# Patient Record
Sex: FEMALE | Race: WHITE | NOT HISPANIC OR LATINO | Employment: FULL TIME | URBAN - METROPOLITAN AREA
[De-identification: names, ages, dates, MRNs, and addresses within clinical notes are randomized per-mention and may not be internally consistent; named-entity substitution may affect disease eponyms.]

---

## 2017-05-25 ENCOUNTER — OFFICE VISIT (OUTPATIENT)
Dept: INTERNAL MEDICINE | Facility: CLINIC | Age: 32
End: 2017-05-25
Payer: COMMERCIAL

## 2017-05-25 VITALS
HEIGHT: 65 IN | DIASTOLIC BLOOD PRESSURE: 74 MMHG | HEART RATE: 79 BPM | OXYGEN SATURATION: 99 % | WEIGHT: 140.44 LBS | SYSTOLIC BLOOD PRESSURE: 110 MMHG | BODY MASS INDEX: 23.4 KG/M2

## 2017-05-25 DIAGNOSIS — Z00.00 VISIT FOR ANNUAL HEALTH EXAMINATION: Primary | ICD-10-CM

## 2017-05-25 DIAGNOSIS — R82.90 ABNORMAL URINALYSIS: ICD-10-CM

## 2017-05-25 LAB
BACTERIA #/AREA URNS AUTO: NORMAL /HPF
BILIRUB UR QL STRIP: NEGATIVE
CLARITY UR REFRACT.AUTO: CLEAR
COLOR UR AUTO: YELLOW
GLUCOSE UR QL STRIP: NEGATIVE
HGB UR QL STRIP: NEGATIVE
KETONES UR QL STRIP: NEGATIVE
LEUKOCYTE ESTERASE UR QL STRIP: ABNORMAL
MICROSCOPIC COMMENT: NORMAL
NITRITE UR QL STRIP: NEGATIVE
PH UR STRIP: 5 [PH] (ref 5–8)
PROT UR QL STRIP: NEGATIVE
RBC #/AREA URNS AUTO: 1 /HPF (ref 0–4)
SP GR UR STRIP: 1.02 (ref 1–1.03)
SQUAMOUS #/AREA URNS AUTO: 5 /HPF
URN SPEC COLLECT METH UR: ABNORMAL
UROBILINOGEN UR STRIP-ACNC: NEGATIVE EU/DL
WBC #/AREA URNS AUTO: 2 /HPF (ref 0–5)

## 2017-05-25 PROCEDURE — 99999 PR PBB SHADOW E&M-NEW PATIENT-LVL III: CPT | Mod: PBBFAC,,, | Performed by: INTERNAL MEDICINE

## 2017-05-25 PROCEDURE — 99395 PREV VISIT EST AGE 18-39: CPT | Mod: S$GLB,,, | Performed by: INTERNAL MEDICINE

## 2017-05-25 PROCEDURE — 81001 URINALYSIS AUTO W/SCOPE: CPT

## 2017-05-25 NOTE — PROGRESS NOTES
INTERNAL MEDICINE INITIAL VISIT NOTE      CHIEF COMPLAINT     Chief Complaint   Patient presents with    Annual Exam       HPI     Suyapa Merritt is a 31 y.o.  female who presents with a PMHx of     Past Medical History:  Past Medical History:   Diagnosis Date    Seizures     at 15 yo, w/u neg, ?sz in , no issues since     Here for annual visit.  Prev seen by [facility] at [facility] last [facility] at which time labs were done which were all apparently normal x microscopic hematuria on u/a but says she thinks she was at the end of her menstrual cycle and here for repeat.    Is getting  in Nov and also has questions regarding pre- vitamins.  Admits to anxiety when it comes to gyn related issues but has prev discussed this c Dr. Herman, her gyn, and currently s issues and utd on her pap.    Had excision of a mole by Dr. Marin yesterday 2/2 dysplasia on bx, final path pending.    Past Surgical History:  Past Surgical History:   Procedure Laterality Date    mole excision      dysplasia, no melanoma       Home Medications:  Prior to Admission medications    Not on File       Allergies:  Review of patient's allergies indicates:  No Known Allergies    Family History:  Family History   Problem Relation Age of Onset    Osteoarthritis Mother     No Known Problems Father     Skin cancer Maternal Grandfather     Osteoporosis Paternal Grandmother     No Known Problems Paternal Grandfather        Social History:  Social History   Substance Use Topics    Smoking status: Never Smoker    Smokeless tobacco: Not on file    Alcohol use Yes      Comment: socially       Review of Systems:  Review of Systems   Constitutional: Negative for appetite change, chills, fatigue, fever and unexpected weight change.   HENT: Negative for congestion, hearing loss and rhinorrhea.    Eyes: Negative for pain and visual disturbance.   Respiratory: Negative for cough, chest tightness, shortness of breath and wheezing.   "  Cardiovascular: Negative for chest pain, palpitations and leg swelling.   Gastrointestinal: Negative for abdominal distention and abdominal pain.   Endocrine: Negative for polydipsia and polyuria.   Genitourinary: Negative for decreased urine volume, difficulty urinating, dysuria, hematuria and vaginal discharge.   Neurological: Negative for weakness, numbness and headaches.   Psychiatric/Behavioral: Negative for behavioral problems and confusion.       Health Maintenance:   Immunizations:   Influenza is not up to date, rec this Fall  TDap is up to date 2-3 years ago done at urgent care p stepping on a nail    Cancer Screening:  PAP: is up to date. Fall 2016, told neg c neg HPV and f/u 5 yrs, Dr. Herman  Mammogram: rec at 40    PHYSICAL EXAM     /74 (BP Location: Right arm, Patient Position: Sitting)   Pulse 79   Ht 5' 5" (1.651 m)   Wt 63.7 kg (140 lb 6.9 oz)   SpO2 99%   BMI 23.37 kg/m²     GEN - A+OX4, NAD   HEENT - PERRL, EOMI, OP clear x small growth of skin near R salivary gland under tongue, pt to f/u c dentist this week.  Neck - No thyromegaly or cervical LAD. No thyroid masses felt.  CV - RRR, no m/r/g  Chest - CTAB, no wheezing, crackles, or rhonchi  Abd - S/NT/ND/+BS.   Ext - 2+BDP and radial pulses. No C/C/E.  Neuro - 5/5 BUE and BLE strength.  LN - No LAD appreciated.  Skin - Normal color and texture, no rash, no skin lesions.      LABS     From [facility] Fall [facility] requested today.    ASSESSMENT/PLAN     Suyapa Merritt is a 31 y.o. female with  Suyapa was seen today for annual exam.    Diagnoses and all orders for this visit:    Visit for annual health examination        -     History and physical exam completed.  Health maintenance reviewed as above.        - Can take prenatal MVI now in prep for potential conception later this year or next.    Abnormal urinalysis  -     Hx ashley hematuria likely 2/2 menses.        - URINALYSIS to be done today.    Hx seizures        - No issues, will monitor " for now, avoid meds that can lower sz threshold.    HM as above    RTC in 12 months, sooner if needed and depending on labs.    Radha Negron MD  Department of Internal Medicine - [facility]  05/25/2017  8:51 AM

## 2017-12-08 ENCOUNTER — OFFICE VISIT (OUTPATIENT)
Dept: INTERNAL MEDICINE | Facility: CLINIC | Age: 32
End: 2017-12-08
Payer: COMMERCIAL

## 2017-12-08 VITALS
OXYGEN SATURATION: 99 % | BODY MASS INDEX: 22.34 KG/M2 | SYSTOLIC BLOOD PRESSURE: 120 MMHG | DIASTOLIC BLOOD PRESSURE: 72 MMHG | WEIGHT: 134.25 LBS | HEART RATE: 76 BPM

## 2017-12-08 DIAGNOSIS — O03.9 MISCARRIAGE: ICD-10-CM

## 2017-12-08 DIAGNOSIS — R14.2 BELCHING: Primary | ICD-10-CM

## 2017-12-08 PROCEDURE — 99999 PR PBB SHADOW E&M-EST. PATIENT-LVL III: CPT | Mod: PBBFAC,,, | Performed by: INTERNAL MEDICINE

## 2017-12-08 PROCEDURE — 99213 OFFICE O/P EST LOW 20 MIN: CPT | Mod: S$GLB,,, | Performed by: INTERNAL MEDICINE

## 2017-12-08 RX ORDER — NAPROXEN SODIUM 550 MG/1
550 TABLET ORAL
COMMUNITY
End: 2018-05-23

## 2017-12-08 RX ORDER — MISOPROSTOL 200 UG/1
100 TABLET ORAL NIGHTLY
COMMUNITY
End: 2018-05-23

## 2017-12-08 RX ORDER — OXYCODONE HYDROCHLORIDE AND ACETAMINOPHEN 325; 5 MG/5ML; MG/5ML
SOLUTION ORAL
COMMUNITY
End: 2018-05-23

## 2017-12-08 NOTE — PROGRESS NOTES
Subjective:       Patient ID: Suyapa Merritt is a 32 y.o. female.    Chief Complaint: Gastroesophageal Reflux and Bloated     HPI: Patient complains of mild abdominal cramps and belching 3 days ago she was discovered to have a miscarriage.  It was incomplete and she had to use misoprostol vaginally and was given Percocet and naproxen.  She took those for little over 24 hr not in the last 24 hr she had a bowel movement yesterday and today, ate some oatmeal this but was having some upper abdominal cramps and belching.  She thought she was having indigestion and took some Pepto-Bismol and some Tums.  This helped a little but the belching has continued.  She is constantly belching office she says she is tired and just like take a nap that she has not been sleeping well.  She is still having some lower pelvic cramps that is better than was day or 2 ago.  She has a call in to her gynecologist but has not no fever, blood in the stool or urine.  Vomiting.  No cough no rash      Gastroesophageal Reflux   She complains of abdominal pain ( mild drainage with belching). She reports no chest pain, no coughing, no sore throat or no wheezing.     Review of Systems   Constitutional: Negative for appetite change, chills and fever.   HENT: Negative for nosebleeds, sinus pain and sore throat.    Eyes: Negative for visual disturbance.   Respiratory: Negative for cough, shortness of breath and wheezing.    Cardiovascular: Negative for chest pain and leg swelling.   Gastrointestinal: Positive for abdominal pain ( mild drainage with belching). Negative for constipation and diarrhea.   Genitourinary: Negative for difficulty urinating and hematuria.   Musculoskeletal: Negative for neck pain and neck stiffness.   Skin: Negative for pallor and rash.   Neurological: Negative for headaches.   Psychiatric/Behavioral: Negative for dysphoric mood and suicidal ideas. The patient is not nervous/anxious.        Objective:      Physical Exam    Constitutional: She is oriented to person, place, and time. She appears well-developed and well-nourished. No distress.   HENT:   Head: Normocephalic and atraumatic.   Right Ear: External ear normal.   Left Ear: External ear normal.   Mouth/Throat: Oropharynx is clear and moist. No oropharyngeal exudate.   Eyes: Conjunctivae are normal. Pupils are equal, round, and reactive to light. No scleral icterus.   Neck: Normal range of motion. Neck supple. No thyromegaly present.   Cardiovascular: Normal rate and regular rhythm.    No murmur heard.  Pulmonary/Chest: Effort normal and breath sounds normal. She has no wheezes.   Abdominal: Soft. Bowel sounds are normal. She exhibits no distension. There is no tenderness.   Normal active BS  No guarding rebound.  No rash or hernias   Musculoskeletal: She exhibits no tenderness.   Lymphadenopathy:     She has no cervical adenopathy.   Neurological: She is alert and oriented to person, place, and time.   Skin: No rash noted.   Psychiatric: She has a normal mood and affect.       Assessment:       1. Belching    2. Miscarriage        Plan:       Suyapa was seen today for gastroesophageal reflux and bloated.    Diagnoses and all orders for this visit:    Belching    Miscarriage         Felt to be from food, medicine, and anxiety.  Suggest bland diet, consider liquid antacid but not Pepto-Bismol avoid Percocet and naproxen and possibly.  Touch base with gynecologist call if symptoms fail to improve or worsen

## 2018-05-23 ENCOUNTER — OFFICE VISIT (OUTPATIENT)
Dept: INTERNAL MEDICINE | Facility: CLINIC | Age: 33
End: 2018-05-23
Payer: COMMERCIAL

## 2018-05-23 VITALS
DIASTOLIC BLOOD PRESSURE: 72 MMHG | HEART RATE: 81 BPM | HEIGHT: 65 IN | BODY MASS INDEX: 23.32 KG/M2 | SYSTOLIC BLOOD PRESSURE: 104 MMHG | OXYGEN SATURATION: 99 % | WEIGHT: 140 LBS

## 2018-05-23 DIAGNOSIS — Z00.00 VISIT FOR ANNUAL HEALTH EXAMINATION: Primary | ICD-10-CM

## 2018-05-23 DIAGNOSIS — Z13.1 SCREENING FOR DIABETES MELLITUS: ICD-10-CM

## 2018-05-23 DIAGNOSIS — F41.9 ANXIETY: ICD-10-CM

## 2018-05-23 DIAGNOSIS — Z13.0 SCREENING, ANEMIA, DEFICIENCY, IRON: ICD-10-CM

## 2018-05-23 DIAGNOSIS — Z13.29 SCREENING FOR THYROID DISORDER: ICD-10-CM

## 2018-05-23 DIAGNOSIS — Z13.220 LIPID SCREENING: ICD-10-CM

## 2018-05-23 PROCEDURE — 99999 PR PBB SHADOW E&M-EST. PATIENT-LVL III: CPT | Mod: PBBFAC,,, | Performed by: INTERNAL MEDICINE

## 2018-05-23 PROCEDURE — 99395 PREV VISIT EST AGE 18-39: CPT | Mod: S$GLB,,, | Performed by: INTERNAL MEDICINE

## 2018-05-23 RX ORDER — ALPRAZOLAM 0.5 MG/1
TABLET ORAL
Qty: 1 TABLET | Refills: 0 | Status: SHIPPED | OUTPATIENT
Start: 2018-05-23 | End: 2020-01-22

## 2018-05-23 NOTE — PROGRESS NOTES
INTERNAL MEDICINE ESTABLISHED PATIENT VISIT NOTE    Subjective:     Chief Complaint: Annual Exam       Patient ID: Suyapa Merritt is a 32 y.o. female with hx seizures in childhood but none since, last seen by me 5/2017, here today for annual exam.    Since last appt, got pregnant but miscarried in Dec.  Says heartbeat was never detected.    Now considering trying to get pregnant again, has appt pending to [facility] robert Lawson.    Past Medical History:  Past Medical History:   Diagnosis Date    Seizures     at 15 yo, w/u neg, ?sz in 2005, no issues since       Home Medications:  Prior to Admission medications    Medication Sig Start Date End Date Taking? Authorizing Provider   miSOPROStol (CYTOTEC) 200 MCG Tab Take 100 mcg by mouth nightly.    Historical Provider, MD   naproxen sodium (ANAPROX) 550 MG tablet Take 550 mg by mouth as needed.    Historical Provider, MD   oxyCODONE-acetaminophen (ROXICET) 5-325 mg/5 mL Soln Take by mouth.    Historical Provider, MD       Allergies:  Review of patient's allergies indicates:  No Known Allergies    Social History:  Social History   Substance Use Topics    Smoking status: Never Smoker    Smokeless tobacco: Not on file    Alcohol use Yes      Comment: socially        Review of Systems   Constitutional: Negative for activity change and unexpected weight change.   HENT: Negative for hearing loss, rhinorrhea and trouble swallowing.    Eyes: Negative for discharge and visual disturbance.   Respiratory: Negative for chest tightness and wheezing.    Cardiovascular: Negative for chest pain and palpitations.   Gastrointestinal: Negative for blood in stool, constipation, diarrhea and vomiting.   Endocrine: Negative for polydipsia and polyuria.   Genitourinary: Negative for difficulty urinating, dysuria, hematuria and menstrual problem.   Musculoskeletal: Negative for arthralgias, joint swelling and neck pain.   Neurological: Negative for weakness and headaches.  "  Psychiatric/Behavioral: Negative for confusion and dysphoric mood.         Health Maintenance:     Immunizations:   Influenza is not up to date, rec this Fall  TDap is up to date around 2015 done at urgent care p stepping on a nail     Cancer Screening:  PAP: is up to date. Fall 2016, told neg c neg HPV and f/u 5 yrs, Dr. Herman  Mammogram: rec at 40    Objective:   /72 (BP Location: Left arm, Patient Position: Sitting, BP Method: Large (Manual))   Pulse 81   Ht 5' 5" (1.651 m)   Wt 63.5 kg (139 lb 15.9 oz)   LMP 04/30/2018 (Within Days)   SpO2 99%   BMI 23.30 kg/m²        General: AAO x3, no apparent distress  CV: RRR, no m/r/g  Pulm: Lungs CTAB, no crackles, no wheezes  Abd: s/NT/ND +BS  Extremities: no c/c/e    Labs:         Assessment/Plan     Suyapa was seen today for annual exam.    Diagnoses and all orders for this visit:    Visit for annual health examination  History and physical exam completed.  Health maintenance reviewed as above.    Screening, anemia, deficiency, iron  -     CBC auto differential; Future    Screening for diabetes mellitus  -     Comprehensive metabolic panel; Future  -     Hemoglobin A1c; Future    Lipid screening  -     Lipid panel; Future    Screening for thyroid disorder  -     TSH; Future    Anxiety  Related to lab draws, fainted on last appt  Will give xanax 30 min prior and her  will take her.  -     ALPRAZolam (XANAX) 0.5 MG tablet; Take one tablet 30 min prior to lab draw.        HM as above  RTC in one year for f/u, sooner if needed and depending on labs.    Radha Negron MD  Department of Internal Medicine - [facility]  05/23/2018  "

## 2018-06-07 ENCOUNTER — LAB VISIT (OUTPATIENT)
Dept: LAB | Facility: HOSPITAL | Age: 33
End: 2018-06-07
Attending: INTERNAL MEDICINE
Payer: COMMERCIAL

## 2018-06-07 DIAGNOSIS — Z13.1 SCREENING FOR DIABETES MELLITUS: ICD-10-CM

## 2018-06-07 DIAGNOSIS — Z13.0 SCREENING, ANEMIA, DEFICIENCY, IRON: ICD-10-CM

## 2018-06-07 DIAGNOSIS — Z13.220 LIPID SCREENING: ICD-10-CM

## 2018-06-07 DIAGNOSIS — Z13.29 SCREENING FOR THYROID DISORDER: ICD-10-CM

## 2018-06-07 LAB
ALBUMIN SERPL BCP-MCNC: 4 G/DL
ALP SERPL-CCNC: 42 U/L
ALT SERPL W/O P-5'-P-CCNC: 19 U/L
ANION GAP SERPL CALC-SCNC: 6 MMOL/L
AST SERPL-CCNC: 20 U/L
BASOPHILS # BLD AUTO: 0.02 K/UL
BASOPHILS NFR BLD: 0.4 %
BILIRUB SERPL-MCNC: 0.6 MG/DL
BUN SERPL-MCNC: 10 MG/DL
CALCIUM SERPL-MCNC: 9.2 MG/DL
CHLORIDE SERPL-SCNC: 108 MMOL/L
CHOLEST SERPL-MCNC: 159 MG/DL
CHOLEST/HDLC SERPL: 2.9 {RATIO}
CO2 SERPL-SCNC: 26 MMOL/L
CREAT SERPL-MCNC: 0.7 MG/DL
DIFFERENTIAL METHOD: NORMAL
EOSINOPHIL # BLD AUTO: 0.1 K/UL
EOSINOPHIL NFR BLD: 2.5 %
ERYTHROCYTE [DISTWIDTH] IN BLOOD BY AUTOMATED COUNT: 12.5 %
EST. GFR  (AFRICAN AMERICAN): >60 ML/MIN/1.73 M^2
EST. GFR  (NON AFRICAN AMERICAN): >60 ML/MIN/1.73 M^2
ESTIMATED AVG GLUCOSE: 85 MG/DL
GLUCOSE SERPL-MCNC: 89 MG/DL
HBA1C MFR BLD HPLC: 4.6 %
HCT VFR BLD AUTO: 38.5 %
HDLC SERPL-MCNC: 55 MG/DL
HDLC SERPL: 34.6 %
HGB BLD-MCNC: 12.7 G/DL
LDLC SERPL CALC-MCNC: 84.8 MG/DL
LYMPHOCYTES # BLD AUTO: 1.8 K/UL
LYMPHOCYTES NFR BLD: 35.1 %
MCH RBC QN AUTO: 29.7 PG
MCHC RBC AUTO-ENTMCNC: 33 G/DL
MCV RBC AUTO: 90 FL
MONOCYTES # BLD AUTO: 0.5 K/UL
MONOCYTES NFR BLD: 9.2 %
NEUTROPHILS # BLD AUTO: 2.7 K/UL
NEUTROPHILS NFR BLD: 52.6 %
NONHDLC SERPL-MCNC: 104 MG/DL
PLATELET # BLD AUTO: 168 K/UL
PMV BLD AUTO: 10 FL
POTASSIUM SERPL-SCNC: 4.4 MMOL/L
PROT SERPL-MCNC: 6.8 G/DL
RBC # BLD AUTO: 4.28 M/UL
SODIUM SERPL-SCNC: 140 MMOL/L
TRIGL SERPL-MCNC: 96 MG/DL
TSH SERPL DL<=0.005 MIU/L-ACNC: 1.35 UIU/ML
WBC # BLD AUTO: 5.1 K/UL

## 2018-06-07 PROCEDURE — 80061 LIPID PANEL: CPT

## 2018-06-07 PROCEDURE — 83036 HEMOGLOBIN GLYCOSYLATED A1C: CPT

## 2018-06-07 PROCEDURE — 80053 COMPREHEN METABOLIC PANEL: CPT

## 2018-06-07 PROCEDURE — 36415 COLL VENOUS BLD VENIPUNCTURE: CPT

## 2018-06-07 PROCEDURE — 84443 ASSAY THYROID STIM HORMONE: CPT

## 2018-06-07 PROCEDURE — 85025 COMPLETE CBC W/AUTO DIFF WBC: CPT

## 2019-01-22 ENCOUNTER — TELEPHONE (OUTPATIENT)
Dept: INTERNAL MEDICINE | Facility: CLINIC | Age: 34
End: 2019-01-22

## 2019-01-22 DIAGNOSIS — K21.9 GASTROESOPHAGEAL REFLUX DISEASE, ESOPHAGITIS PRESENCE NOT SPECIFIED: Primary | ICD-10-CM

## 2019-01-22 RX ORDER — OMEPRAZOLE 40 MG/1
40 CAPSULE, DELAYED RELEASE ORAL DAILY
Qty: 90 CAPSULE | Refills: 0 | Status: SHIPPED | OUTPATIENT
Start: 2019-01-22 | End: 2021-02-03

## 2019-01-22 NOTE — TELEPHONE ENCOUNTER
----- Message from Taylor Edwards sent at 1/22/2019  1:10 PM CST -----  Contact: [facility] request  Message     ----- Message from [facility], System Message sent at 1/21/2019 11:29 AM CST -----    Appointment Request From: Suyapa Merritt    With Provider: Radha Negron MD [[facility] - Internal Medicine]    Preferred Date Range: 1/21/2019 - 1/31/2019    Preferred Times: Any time    Reason for visit: Existing Patient    Comments:  Hi, I've had bad stomach acid for a few months. Painful burping all day/night, before/during/after meals. Tried tums/Pepcid with no luck.

## 2019-01-22 NOTE — TELEPHONE ENCOUNTER
Spoke to pt who states worsening heartburn symptoms with reflux and belching, associated c meals.  Has tried tums and zantac s relief.  No wt loss, dysphagia, blood in stools, or black/tarry stools.  rec trial of PPI such as prilosec.  If not better in 1-2 mos, pt to contact me for f/u appt at which time we can consider EGD/H.pylori testing.  rx sent.

## 2019-01-29 ENCOUNTER — PATIENT MESSAGE (OUTPATIENT)
Dept: INTERNAL MEDICINE | Facility: CLINIC | Age: 34
End: 2019-01-29

## 2019-01-29 DIAGNOSIS — Z82.79 FAMILY HISTORY OF BICUSPID AORTIC VALVE: Primary | ICD-10-CM

## 2019-01-30 ENCOUNTER — PATIENT MESSAGE (OUTPATIENT)
Dept: INTERNAL MEDICINE | Facility: CLINIC | Age: 34
End: 2019-01-30

## 2019-01-30 NOTE — TELEPHONE ENCOUNTER
Pt reports mom c bicuspid aorta and aneurysm, requesting screening.  Will check echo, MA to assist c scheduling.

## 2019-02-06 ENCOUNTER — HOSPITAL ENCOUNTER (OUTPATIENT)
Dept: CARDIOLOGY | Facility: CLINIC | Age: 34
Discharge: HOME | End: 2019-02-06
Attending: INTERNAL MEDICINE
Payer: COMMERCIAL

## 2019-02-06 VITALS
DIASTOLIC BLOOD PRESSURE: 62 MMHG | WEIGHT: 139 LBS | SYSTOLIC BLOOD PRESSURE: 126 MMHG | HEIGHT: 65 IN | HEART RATE: 78 BPM | BODY MASS INDEX: 23.16 KG/M2

## 2019-02-06 DIAGNOSIS — Z82.79 FAMILY HISTORY OF BICUSPID AORTIC VALVE: ICD-10-CM

## 2019-02-06 LAB
ASCENDING AORTA: 3.48 CM
BSA FOR ECHO PROCEDURE: 1.7 M2
CV ECHO LV RWT: 0.25 CM
DOP CALC LVOT AREA: 3.05 CM2
DOP CALC LVOT DIAMETER: 1.97 CM
DOP CALC LVOT PEAK VEL: 1.11 M/S
DOP CALC LVOT STROKE VOLUME: 60.84 CM3
DOP CALCLVOT PEAK VEL VTI: 19.97 CM
E WAVE DECELERATION TIME: 145.55 MSEC
E/A RATIO: 1.44
E/E' RATIO: 4.6
ECHO LV POSTERIOR WALL: 0.61 CM (ref 0.6–1.1)
FRACTIONAL SHORTENING: 29 % (ref 28–44)
INTERVENTRICULAR SEPTUM: 0.59 CM (ref 0.6–1.1)
IVRT: 0.07 MSEC
LA MAJOR: 4.76 CM
LA MINOR: 4.4 CM
LA WIDTH: 3.42 CM
LEFT ATRIUM SIZE: 3.26 CM
LEFT ATRIUM VOLUME INDEX: 25.6 ML/M2
LEFT ATRIUM VOLUME: 43.34 CM3
LEFT INTERNAL DIMENSION IN SYSTOLE: 3.42 CM (ref 2.1–4)
LEFT VENTRICLE DIASTOLIC VOLUME INDEX: 64.72 ML/M2
LEFT VENTRICLE DIASTOLIC VOLUME: 109.68 ML
LEFT VENTRICLE MASS INDEX: 52.9 G/M2
LEFT VENTRICLE SYSTOLIC VOLUME INDEX: 28.3 ML/M2
LEFT VENTRICLE SYSTOLIC VOLUME: 47.94 ML
LEFT VENTRICULAR INTERNAL DIMENSION IN DIASTOLE: 4.84 CM (ref 3.5–6)
LEFT VENTRICULAR MASS: 89.6 G
LV LATERAL E/E' RATIO: 3.83
LV SEPTAL E/E' RATIO: 5.75
MV PEAK A VEL: 0.48 M/S
MV PEAK E VEL: 0.69 M/S
RA MAJOR: 4.13 CM
RA PRESSURE: 3 MMHG
RA WIDTH: 4.1 CM
RIGHT VENTRICULAR END-DIASTOLIC DIMENSION: 3.46 CM
RV TISSUE DOPPLER FREE WALL SYSTOLIC VELOCITY 1 (APICAL 4 CHAMBER VIEW): 12.12 M/S
SINUS: 2.91 CM
STJ: 2.69 CM
TDI LATERAL: 0.18
TDI SEPTAL: 0.12
TDI: 0.15
TRICUSPID ANNULAR PLANE SYSTOLIC EXCURSION: 1.97 CM

## 2019-02-06 PROCEDURE — 93306 TTE W/DOPPLER COMPLETE: CPT | Mod: S$GLB,,, | Performed by: INTERNAL MEDICINE

## 2019-02-06 PROCEDURE — 93306 TRANSTHORACIC ECHO (TTE) COMPLETE (CUPID ONLY): ICD-10-PCS | Mod: S$GLB,,, | Performed by: INTERNAL MEDICINE

## 2019-03-15 LAB — HPV MRNA E6/E7: NOT DETECTED

## 2020-01-02 ENCOUNTER — TELEPHONE (OUTPATIENT)
Dept: INTERNAL MEDICINE | Facility: CLINIC | Age: 35
End: 2020-01-02

## 2020-01-02 DIAGNOSIS — Z00.00 ANNUAL PHYSICAL EXAM: Primary | ICD-10-CM

## 2020-01-02 NOTE — TELEPHONE ENCOUNTER
----- Message from Marguerite Do sent at 1/2/2020  1:47 PM CST -----  Contact: self/ [phone]  Doctor appointment and lab have been scheduled.  Please link lab orders to the lab appointment.  Date of doctor appointment:  5/6  Date of lab appointment:    Physical or EP: phys  Comments: Patient is to have labs done at [facility] the week of April 25

## 2020-01-02 NOTE — TELEPHONE ENCOUNTER
----- Message from Dottie Romero sent at 1/2/2020 10:21 AM CST -----  Contact: Pt   Pt calling in to schedule annual.   Pt can be reached at [phone] thanks

## 2020-01-07 ENCOUNTER — PATIENT MESSAGE (OUTPATIENT)
Dept: INTERNAL MEDICINE | Facility: CLINIC | Age: 35
End: 2020-01-07

## 2020-01-08 ENCOUNTER — PATIENT OUTREACH (OUTPATIENT)
Dept: ADMINISTRATIVE | Facility: HOSPITAL | Age: 35
End: 2020-01-08

## 2020-01-08 NOTE — PROGRESS NOTES
Chart review completed. Pap & HPV results located in Care Everywhere- results entered & linked to health maintenance.

## 2020-01-21 LAB
ALBUMIN SERPL-MCNC: 4.1 G/DL (ref 3.6–5.1)
ALBUMIN/GLOB SERPL: 1.6 (CALC) (ref 1–2.5)
ALP SERPL-CCNC: 83 U/L (ref 33–115)
ALT SERPL-CCNC: 15 U/L (ref 6–29)
AST SERPL-CCNC: 20 U/L (ref 10–30)
BASOPHILS # BLD AUTO: 32 CELLS/UL (ref 0–200)
BASOPHILS NFR BLD AUTO: 0.8 %
BILIRUB SERPL-MCNC: 0.5 MG/DL (ref 0.2–1.2)
BUN SERPL-MCNC: 11 MG/DL (ref 7–25)
BUN/CREAT SERPL: NORMAL (CALC) (ref 6–22)
CALCIUM SERPL-MCNC: 9.1 MG/DL (ref 8.6–10.2)
CHLORIDE SERPL-SCNC: 108 MMOL/L (ref 98–110)
CHOLEST SERPL-MCNC: 155 MG/DL
CHOLEST/HDLC SERPL: 2.2 (CALC)
CO2 SERPL-SCNC: 27 MMOL/L (ref 20–32)
CREAT SERPL-MCNC: 0.65 MG/DL (ref 0.5–1.1)
EOSINOPHIL # BLD AUTO: 92 CELLS/UL (ref 15–500)
EOSINOPHIL NFR BLD AUTO: 2.3 %
ERYTHROCYTE [DISTWIDTH] IN BLOOD BY AUTOMATED COUNT: 12.2 % (ref 11–15)
GFRSERPLBLD MDRD-ARVRAT: 116 ML/MIN/1.73M2
GLOBULIN SER CALC-MCNC: 2.5 G/DL (CALC) (ref 1.9–3.7)
GLUCOSE SERPL-MCNC: 78 MG/DL (ref 65–99)
HCT VFR BLD AUTO: 37.2 % (ref 35–45)
HDLC SERPL-MCNC: 72 MG/DL
HGB BLD-MCNC: 11.9 G/DL (ref 11.7–15.5)
LDLC SERPL CALC-MCNC: 71 MG/DL (CALC)
LYMPHOCYTES # BLD AUTO: 1800 CELLS/UL (ref 850–3900)
LYMPHOCYTES NFR BLD AUTO: 45 %
MCH RBC QN AUTO: 26.9 PG (ref 27–33)
MCHC RBC AUTO-ENTMCNC: 32 G/DL (ref 32–36)
MCV RBC AUTO: 84.2 FL (ref 80–100)
MONOCYTES # BLD AUTO: 372 CELLS/UL (ref 200–950)
MONOCYTES NFR BLD AUTO: 9.3 %
NEUTROPHILS # BLD AUTO: 1704 CELLS/UL (ref 1500–7800)
NEUTROPHILS NFR BLD AUTO: 42.6 %
NONHDLC SERPL-MCNC: 83 MG/DL (CALC)
PLATELET # BLD AUTO: 189 THOUSAND/UL (ref 140–400)
PMV BLD REES-ECKER: 9.7 FL (ref 7.5–12.5)
POTASSIUM SERPL-SCNC: 4.2 MMOL/L (ref 3.5–5.3)
PROT SERPL-MCNC: 6.6 G/DL (ref 6.1–8.1)
RBC # BLD AUTO: 4.42 MILLION/UL (ref 3.8–5.1)
SODIUM SERPL-SCNC: 142 MMOL/L (ref 135–146)
TRIGL SERPL-MCNC: 42 MG/DL
TSH SERPL-ACNC: 0.91 MIU/L
WBC # BLD AUTO: 4 THOUSAND/UL (ref 3.8–10.8)

## 2020-01-21 NOTE — PROGRESS NOTES
INTERNAL MEDICINE ESTABLISHED PATIENT VISIT NOTE    Subjective:     Chief Complaint: Annual Exam       Patient ID: Suyapa Merritt is a 34 y.o. female with hx seizures in childhood but none since, hx uterine fibroids, last seen by me 2018, here today for routine annual exam.    Since last visit, had a healthy baby boy via  with Dr. Lawson about 3 mos ago.  States pregnancy and delivery went well overall.    Currently nursing s issues, makes excess supply and still taking prenatal MVI.    Past Medical History:  Past Medical History:   Diagnosis Date    Seizures     at 15 yo, w/u neg, ?sz in , no issues since       Home Medications:  Prior to Admission medications    Medication Sig Start Date End Date Taking? Authorizing Provider   ALPRAZolam (XANAX) 0.5 MG tablet Take one tablet 30 min prior to lab draw. 18   Radha Negron MD   omeprazole (PRILOSEC) 40 MG capsule Take 1 capsule (40 mg total) by mouth once daily. 19  Radha Negron MD       Allergies:  Review of patient's allergies indicates:  No Known Allergies    Social History:  Social History     Tobacco Use    Smoking status: Never Smoker   Substance Use Topics    Alcohol use: Yes     Comment: socially    Drug use: No        Review of Systems   Constitutional: Negative for appetite change, chills, fatigue, fever and unexpected weight change.   HENT: Negative for congestion, hearing loss and rhinorrhea.    Eyes: Negative for pain and visual disturbance.   Respiratory: Negative for cough, chest tightness, shortness of breath and wheezing.    Cardiovascular: Negative for chest pain, palpitations and leg swelling.   Gastrointestinal: Negative for abdominal distention and abdominal pain.   Endocrine: Negative for polydipsia and polyuria.   Genitourinary: Negative for decreased urine volume, difficulty urinating, dysuria, hematuria and vaginal discharge.   Neurological: Negative for weakness, numbness and headaches.  "  Psychiatric/Behavioral: Negative for behavioral problems and confusion.         Health Maintenance:     Immunizations:   Influenza 9/27/2019 via gyn  TDap 8/29/19     Cancer Screening:  PAP: 3/19 Perret NILM c neg HPV  Mammogram: rec at 40    Objective:   /60 (BP Location: Left arm, Patient Position: Sitting, BP Method: Medium (Manual))   Pulse 79   Ht 5' 5" (1.651 m)   Wt 66.5 kg (146 lb 9.7 oz)   SpO2 99%   BMI 24.40 kg/m²        General: AAO x3, no apparent distress  HEENT: PERRL, OP clear  CV: RRR, no m/r/g  Pulm: Lungs CTAB, no crackles, no wheezes  Abd: s/NT/ND +BS  Extremities: no c/c/e    Labs:     Lab Results   Component Value Date    WBC 4.0 01/20/2020    HGB 11.9 01/20/2020    HCT 37.2 01/20/2020    MCV 84.2 01/20/2020     01/20/2020     Sodium   Date Value Ref Range Status   01/20/2020 142 135 - 146 mmol/L Final     Potassium   Date Value Ref Range Status   01/20/2020 4.2 3.5 - 5.3 mmol/L Final     Chloride   Date Value Ref Range Status   01/20/2020 108 98 - 110 mmol/L Final     CO2   Date Value Ref Range Status   01/20/2020 27 20 - 32 mmol/L Final     Glucose   Date Value Ref Range Status   01/20/2020 78 65 - 99 mg/dL Final     Comment:                   Fasting reference interval          BUN, Bld   Date Value Ref Range Status   01/20/2020 11 7 - 25 mg/dL Final     Creatinine   Date Value Ref Range Status   01/20/2020 0.65 0.50 - 1.10 mg/dL Final     Calcium   Date Value Ref Range Status   01/20/2020 9.1 8.6 - 10.2 mg/dL Final     Total Protein   Date Value Ref Range Status   01/20/2020 6.6 6.1 - 8.1 g/dL Final     Albumin   Date Value Ref Range Status   01/20/2020 4.1 3.6 - 5.1 g/dL Final     Total Bilirubin   Date Value Ref Range Status   01/20/2020 0.5 0.2 - 1.2 mg/dL Final     Alkaline Phosphatase   Date Value Ref Range Status   01/20/2020 83 33 - 115 U/L Final     AST   Date Value Ref Range Status   01/20/2020 20 10 - 30 U/L Final     ALT   Date Value Ref Range Status "   01/20/2020 15 6 - 29 U/L Final     Anion Gap   Date Value Ref Range Status   06/07/2018 6 (L) 8 - 16 mmol/L Final     eGFR if    Date Value Ref Range Status   01/20/2020 134 > OR = 60 mL/min/1.73m2 Final     eGFR if non    Date Value Ref Range Status   01/20/2020 116 > OR = 60 mL/min/1.73m2 Final     Lab Results   Component Value Date    HGBA1C 4.6 06/07/2018     Lab Results   Component Value Date    LDLCALC 71 01/20/2020     Lab Results   Component Value Date    TSH 0.91 01/20/2020         Assessment/Plan     Suyapa was seen today for annual exam.    Diagnoses and all orders for this visit:    Visit for annual health examination  History and physical exam completed.  Health maintenance reviewed as above.  Recent labs reviewed c pt and are all wnl.      HM as above  RTC in 12 mos, sooner if needed.    Radha Negron MD  Department of Internal Medicine - [facility]  01/22/2020

## 2020-01-22 ENCOUNTER — PATIENT MESSAGE (OUTPATIENT)
Dept: INTERNAL MEDICINE | Facility: CLINIC | Age: 35
End: 2020-01-22

## 2020-01-22 ENCOUNTER — OFFICE VISIT (OUTPATIENT)
Dept: INTERNAL MEDICINE | Facility: CLINIC | Age: 35
End: 2020-01-22
Payer: COMMERCIAL

## 2020-01-22 VITALS
WEIGHT: 146.63 LBS | BODY MASS INDEX: 24.43 KG/M2 | SYSTOLIC BLOOD PRESSURE: 106 MMHG | HEART RATE: 79 BPM | HEIGHT: 65 IN | OXYGEN SATURATION: 99 % | DIASTOLIC BLOOD PRESSURE: 60 MMHG

## 2020-01-22 DIAGNOSIS — Z00.00 VISIT FOR ANNUAL HEALTH EXAMINATION: Primary | ICD-10-CM

## 2020-01-22 PROCEDURE — 99395 PREV VISIT EST AGE 18-39: CPT | Mod: S$GLB,,, | Performed by: INTERNAL MEDICINE

## 2020-01-22 PROCEDURE — 99999 PR PBB SHADOW E&M-EST. PATIENT-LVL III: ICD-10-PCS | Mod: PBBFAC,,, | Performed by: INTERNAL MEDICINE

## 2020-01-22 PROCEDURE — 99999 PR PBB SHADOW E&M-EST. PATIENT-LVL III: CPT | Mod: PBBFAC,,, | Performed by: INTERNAL MEDICINE

## 2020-01-22 PROCEDURE — 99395 PR PREVENTIVE VISIT,EST,18-39: ICD-10-PCS | Mod: S$GLB,,, | Performed by: INTERNAL MEDICINE

## 2021-01-27 LAB
HUMAN PAPILLOMAVIRUS (HPV): NORMAL
PAP RECOMMENDATION EXT: NORMAL
PAP SMEAR: NORMAL

## 2021-02-03 ENCOUNTER — OFFICE VISIT (OUTPATIENT)
Dept: INTERNAL MEDICINE | Facility: CLINIC | Age: 36
End: 2021-02-03
Payer: COMMERCIAL

## 2021-02-03 ENCOUNTER — LAB VISIT (OUTPATIENT)
Dept: LAB | Facility: HOSPITAL | Age: 36
End: 2021-02-03
Attending: INTERNAL MEDICINE

## 2021-02-03 ENCOUNTER — IMMUNIZATION (OUTPATIENT)
Dept: INTERNAL MEDICINE | Facility: CLINIC | Age: 36
End: 2021-02-03

## 2021-02-03 VITALS
DIASTOLIC BLOOD PRESSURE: 72 MMHG | WEIGHT: 140 LBS | SYSTOLIC BLOOD PRESSURE: 118 MMHG | HEART RATE: 79 BPM | BODY MASS INDEX: 23.32 KG/M2 | HEIGHT: 65 IN | OXYGEN SATURATION: 99 %

## 2021-02-03 DIAGNOSIS — Z00.00 VISIT FOR ANNUAL HEALTH EXAMINATION: ICD-10-CM

## 2021-02-03 DIAGNOSIS — Z00.00 VISIT FOR ANNUAL HEALTH EXAMINATION: Primary | ICD-10-CM

## 2021-02-03 LAB
BASOPHILS # BLD AUTO: 0.02 K/UL (ref 0–0.2)
BASOPHILS NFR BLD: 0.5 % (ref 0–1.9)
DIFFERENTIAL METHOD: ABNORMAL
EOSINOPHIL # BLD AUTO: 0.1 K/UL (ref 0–0.5)
EOSINOPHIL NFR BLD: 2.4 % (ref 0–8)
ERYTHROCYTE [DISTWIDTH] IN BLOOD BY AUTOMATED COUNT: 12.2 % (ref 11.5–14.5)
HCT VFR BLD AUTO: 39.9 % (ref 37–48.5)
HGB BLD-MCNC: 12.9 G/DL (ref 12–16)
IMM GRANULOCYTES # BLD AUTO: 0.02 K/UL (ref 0–0.04)
IMM GRANULOCYTES NFR BLD AUTO: 0.5 % (ref 0–0.5)
LYMPHOCYTES # BLD AUTO: 1.7 K/UL (ref 1–4.8)
LYMPHOCYTES NFR BLD: 44.5 % (ref 18–48)
MCH RBC QN AUTO: 29.3 PG (ref 27–31)
MCHC RBC AUTO-ENTMCNC: 32.3 G/DL (ref 32–36)
MCV RBC AUTO: 91 FL (ref 82–98)
MONOCYTES # BLD AUTO: 0.3 K/UL (ref 0.3–1)
MONOCYTES NFR BLD: 8.4 % (ref 4–15)
NEUTROPHILS # BLD AUTO: 1.7 K/UL (ref 1.8–7.7)
NEUTROPHILS NFR BLD: 43.7 % (ref 38–73)
NRBC BLD-RTO: 0 /100 WBC
PLATELET # BLD AUTO: 182 K/UL (ref 150–350)
PMV BLD AUTO: 10.4 FL (ref 9.2–12.9)
RBC # BLD AUTO: 4.41 M/UL (ref 4–5.4)
WBC # BLD AUTO: 3.82 K/UL (ref 3.9–12.7)

## 2021-02-03 PROCEDURE — 36415 COLL VENOUS BLD VENIPUNCTURE: CPT

## 2021-02-03 PROCEDURE — 99395 PREV VISIT EST AGE 18-39: CPT | Mod: S$PBB,,, | Performed by: INTERNAL MEDICINE

## 2021-02-03 PROCEDURE — 85025 COMPLETE CBC W/AUTO DIFF WBC: CPT

## 2021-02-03 PROCEDURE — 99999 PR PBB SHADOW E&M-EST. PATIENT-LVL III: CPT | Mod: PBBFAC,,, | Performed by: INTERNAL MEDICINE

## 2021-02-03 PROCEDURE — 80061 LIPID PANEL: CPT

## 2021-02-03 PROCEDURE — 99395 PR PREVENTIVE VISIT,EST,18-39: ICD-10-PCS | Mod: S$PBB,,, | Performed by: INTERNAL MEDICINE

## 2021-02-03 PROCEDURE — 80053 COMPREHEN METABOLIC PANEL: CPT

## 2021-02-03 PROCEDURE — 90471 IMMUNIZATION ADMIN: CPT | Mod: PBBFAC

## 2021-02-03 PROCEDURE — 99999 PR PBB SHADOW E&M-EST. PATIENT-LVL III: ICD-10-PCS | Mod: PBBFAC,,, | Performed by: INTERNAL MEDICINE

## 2021-02-04 LAB
ALBUMIN SERPL BCP-MCNC: 4.3 G/DL (ref 3.5–5.2)
ALP SERPL-CCNC: 57 U/L (ref 55–135)
ALT SERPL W/O P-5'-P-CCNC: 9 U/L (ref 10–44)
ANION GAP SERPL CALC-SCNC: 9 MMOL/L (ref 8–16)
AST SERPL-CCNC: 12 U/L (ref 10–40)
BILIRUB SERPL-MCNC: 0.6 MG/DL (ref 0.1–1)
BUN SERPL-MCNC: 13 MG/DL (ref 6–20)
CALCIUM SERPL-MCNC: 8.7 MG/DL (ref 8.7–10.5)
CHLORIDE SERPL-SCNC: 108 MMOL/L (ref 95–110)
CHOLEST SERPL-MCNC: 152 MG/DL (ref 120–199)
CHOLEST/HDLC SERPL: 2.8 {RATIO} (ref 2–5)
CO2 SERPL-SCNC: 23 MMOL/L (ref 23–29)
CREAT SERPL-MCNC: 0.8 MG/DL (ref 0.5–1.4)
EST. GFR  (AFRICAN AMERICAN): >60 ML/MIN/1.73 M^2
EST. GFR  (NON AFRICAN AMERICAN): >60 ML/MIN/1.73 M^2
GLUCOSE SERPL-MCNC: 81 MG/DL (ref 70–110)
HDLC SERPL-MCNC: 55 MG/DL (ref 40–75)
HDLC SERPL: 36.2 % (ref 20–50)
LDLC SERPL CALC-MCNC: 83.6 MG/DL (ref 63–159)
NONHDLC SERPL-MCNC: 97 MG/DL
POTASSIUM SERPL-SCNC: 4.5 MMOL/L (ref 3.5–5.1)
PROT SERPL-MCNC: 7.3 G/DL (ref 6–8.4)
SODIUM SERPL-SCNC: 140 MMOL/L (ref 136–145)
TRIGL SERPL-MCNC: 67 MG/DL (ref 30–150)

## 2022-01-28 ENCOUNTER — PATIENT MESSAGE (OUTPATIENT)
Dept: INTERNAL MEDICINE | Facility: CLINIC | Age: 37
End: 2022-01-28

## 2022-02-16 ENCOUNTER — PATIENT MESSAGE (OUTPATIENT)
Dept: INTERNAL MEDICINE | Facility: CLINIC | Age: 37
End: 2022-02-16

## 2022-03-16 ENCOUNTER — PATIENT MESSAGE (OUTPATIENT)
Dept: ADMINISTRATIVE | Facility: HOSPITAL | Age: 37
End: 2022-03-16

## 2022-05-26 ENCOUNTER — IMMUNIZATION (OUTPATIENT)
Dept: PRIMARY CARE CLINIC | Facility: CLINIC | Age: 37
End: 2022-05-26

## 2022-05-26 DIAGNOSIS — Z23 NEED FOR VACCINATION: Primary | ICD-10-CM

## 2022-05-26 PROCEDURE — 91306 COVID-19, MRNA, LNP-S, PF, 100 MCG/0.25 ML DOSE VACCINE (MODERNA BOOSTER): CPT | Mod: PBBFAC | Performed by: INTERNAL MEDICINE

## 2022-05-26 PROCEDURE — 0064A COVID-19, MRNA, LNP-S, PF, 100 MCG/0.25 ML DOSE VACCINE (MODERNA BOOSTER): CPT | Mod: CV19,PBBFAC | Performed by: INTERNAL MEDICINE

## 2023-01-09 ENCOUNTER — OFFICE VISIT (OUTPATIENT)
Dept: INTERNAL MEDICINE | Facility: CLINIC | Age: 38
End: 2023-01-09
Payer: COMMERCIAL

## 2023-01-09 VITALS
HEART RATE: 76 BPM | HEIGHT: 65 IN | OXYGEN SATURATION: 98 % | WEIGHT: 152.75 LBS | SYSTOLIC BLOOD PRESSURE: 118 MMHG | DIASTOLIC BLOOD PRESSURE: 76 MMHG | BODY MASS INDEX: 25.45 KG/M2

## 2023-01-09 DIAGNOSIS — Z00.00 VISIT FOR ANNUAL HEALTH EXAMINATION: Primary | ICD-10-CM

## 2023-01-09 DIAGNOSIS — Z13.1 SCREENING FOR DIABETES MELLITUS: ICD-10-CM

## 2023-01-09 DIAGNOSIS — Z13.220 LIPID SCREENING: ICD-10-CM

## 2023-01-09 DIAGNOSIS — Z13.0 SCREENING, ANEMIA, DEFICIENCY, IRON: ICD-10-CM

## 2023-01-09 DIAGNOSIS — Z13.29 SCREENING FOR THYROID DISORDER: ICD-10-CM

## 2023-01-09 PROCEDURE — 1160F PR REVIEW ALL MEDS BY PRESCRIBER/CLIN PHARMACIST DOCUMENTED: ICD-10-PCS | Mod: CPTII,S$GLB,, | Performed by: INTERNAL MEDICINE

## 2023-01-09 PROCEDURE — 1159F MED LIST DOCD IN RCRD: CPT | Mod: CPTII,S$GLB,, | Performed by: INTERNAL MEDICINE

## 2023-01-09 PROCEDURE — 99395 PREV VISIT EST AGE 18-39: CPT | Mod: S$GLB,,, | Performed by: INTERNAL MEDICINE

## 2023-01-09 PROCEDURE — 3074F SYST BP LT 130 MM HG: CPT | Mod: CPTII,S$GLB,, | Performed by: INTERNAL MEDICINE

## 2023-01-09 PROCEDURE — 3008F BODY MASS INDEX DOCD: CPT | Mod: CPTII,S$GLB,, | Performed by: INTERNAL MEDICINE

## 2023-01-09 PROCEDURE — 3078F PR MOST RECENT DIASTOLIC BLOOD PRESSURE < 80 MM HG: ICD-10-PCS | Mod: CPTII,S$GLB,, | Performed by: INTERNAL MEDICINE

## 2023-01-09 PROCEDURE — 3078F DIAST BP <80 MM HG: CPT | Mod: CPTII,S$GLB,, | Performed by: INTERNAL MEDICINE

## 2023-01-09 PROCEDURE — 1159F PR MEDICATION LIST DOCUMENTED IN MEDICAL RECORD: ICD-10-PCS | Mod: CPTII,S$GLB,, | Performed by: INTERNAL MEDICINE

## 2023-01-09 PROCEDURE — 3074F PR MOST RECENT SYSTOLIC BLOOD PRESSURE < 130 MM HG: ICD-10-PCS | Mod: CPTII,S$GLB,, | Performed by: INTERNAL MEDICINE

## 2023-01-09 PROCEDURE — 3008F PR BODY MASS INDEX (BMI) DOCUMENTED: ICD-10-PCS | Mod: CPTII,S$GLB,, | Performed by: INTERNAL MEDICINE

## 2023-01-09 PROCEDURE — 99999 PR PBB SHADOW E&M-EST. PATIENT-LVL III: CPT | Mod: PBBFAC,,, | Performed by: INTERNAL MEDICINE

## 2023-01-09 PROCEDURE — 99999 PR PBB SHADOW E&M-EST. PATIENT-LVL III: ICD-10-PCS | Mod: PBBFAC,,, | Performed by: INTERNAL MEDICINE

## 2023-01-09 PROCEDURE — 1160F RVW MEDS BY RX/DR IN RCRD: CPT | Mod: CPTII,S$GLB,, | Performed by: INTERNAL MEDICINE

## 2023-01-09 PROCEDURE — 99395 PR PREVENTIVE VISIT,EST,18-39: ICD-10-PCS | Mod: S$GLB,,, | Performed by: INTERNAL MEDICINE

## 2023-01-09 NOTE — PROGRESS NOTES
INTERNAL MEDICINE ESTABLISHED PATIENT VISIT NOTE    Subjective:     Chief Complaint: Annual Exam       Patient ID: Suyapa Merritt is a 37 y.o. female with hx seizures in childhood but none since, hx uterine fibroids, last seen by me a year ago, here today for annual exam.    Had a baby last Aug via  s complications.  Had COVID during pregnancy which took a while to recover from but has no residual sx.  Had some anxiety intermittently but states it has been much better compared to after her first pregnancy.  Not currently requiring meds.  Still seeing Dr. Nair.      Past Medical History:  Past Medical History:   Diagnosis Date    Seizures     at 15 yo, w/u neg, ?sz in , no issues since       Home Medications:  Prior to Admission medications    Not on File       Allergies:  Review of patient's allergies indicates:  No Known Allergies    Social History:  Social History     Tobacco Use    Smoking status: Never   Substance Use Topics    Alcohol use: Yes     Comment: socially    Drug use: No        Review of Systems   Constitutional:  Negative for appetite change, chills, fatigue, fever and unexpected weight change.   HENT:  Negative for congestion, hearing loss and rhinorrhea.    Eyes:  Negative for pain and visual disturbance.   Respiratory:  Negative for cough, chest tightness, shortness of breath and wheezing.    Cardiovascular:  Negative for chest pain, palpitations and leg swelling.   Gastrointestinal:  Negative for abdominal distention and abdominal pain.   Endocrine: Negative for polydipsia and polyuria.   Genitourinary:  Negative for decreased urine volume, difficulty urinating, dysuria, hematuria and vaginal discharge.   Neurological:  Negative for weakness, numbness and headaches.   Psychiatric/Behavioral:  Negative for behavioral problems and confusion.        Health Maintenance:     Immunizations:   Influenza rec now.  TDap 2022  COVID 2021, 2022, booster rec when ready.     Cancer  "Screening:  PAP: 1/27/21 Perret NILM c neg HPV, has [facility] pending for Feb.  Mammogram: 1/21/21 due to palpable lump in R breast c fibroglandular breast tissue and B u/s done c rec f/u 6 mos which pt states was normal.  Colonoscopy rec at 45    Objective:   /76 (BP Location: Left arm, Patient Position: Sitting, BP Method: Medium (Manual))   Pulse 76   Ht 5' 5" (1.651 m)   Wt 69.3 kg (152 lb 12.5 oz)   SpO2 98%   BMI 25.42 kg/m²        General: AAO x3, no apparent distress  HEENT: PERRL, OP clear  CV: RRR, no m/r/g  Pulm: Lungs CTAB, no crackles, no wheezes  Abd: s/NT/ND +BS  Extremities: no c/c/e    Labs:     Lab Results   Component Value Date    WBC 3.82 (L) 02/03/2021    HGB 12.9 02/03/2021    HCT 39.9 02/03/2021    MCV 91 02/03/2021     02/03/2021     Sodium   Date Value Ref Range Status   02/03/2021 140 136 - 145 mmol/L Final     Potassium   Date Value Ref Range Status   02/03/2021 4.5 3.5 - 5.1 mmol/L Final     Chloride   Date Value Ref Range Status   02/03/2021 108 95 - 110 mmol/L Final     CO2   Date Value Ref Range Status   02/03/2021 23 23 - 29 mmol/L Final     Glucose   Date Value Ref Range Status   02/03/2021 81 70 - 110 mg/dL Final     BUN   Date Value Ref Range Status   02/03/2021 13 6 - 20 mg/dL Final     Creatinine   Date Value Ref Range Status   02/03/2021 0.8 0.5 - 1.4 mg/dL Final     Calcium   Date Value Ref Range Status   02/03/2021 8.7 8.7 - 10.5 mg/dL Final     Total Protein   Date Value Ref Range Status   02/03/2021 7.3 6.0 - 8.4 g/dL Final     Albumin   Date Value Ref Range Status   02/03/2021 4.3 3.5 - 5.2 g/dL Final     Total Bilirubin   Date Value Ref Range Status   02/03/2021 0.6 0.1 - 1.0 mg/dL Final     Comment:     For infants and newborns, interpretation of results should be based  on gestational age, weight and in agreement with clinical  observations.    Premature Infant recommended reference ranges:  Up to 24 hours.............<8.0 mg/dL  Up to 48 " hours............<12.0 mg/dL  3-5 days..................<15.0 mg/dL  6-29 days.................<15.0 mg/dL       Alkaline Phosphatase   Date Value Ref Range Status   02/03/2021 57 55 - 135 U/L Final     AST   Date Value Ref Range Status   02/03/2021 12 10 - 40 U/L Final     ALT   Date Value Ref Range Status   02/03/2021 9 (L) 10 - 44 U/L Final     Anion Gap   Date Value Ref Range Status   02/03/2021 9 8 - 16 mmol/L Final     eGFR if    Date Value Ref Range Status   02/03/2021 >60.0 >60 mL/min/1.73 m^2 Final     eGFR if non    Date Value Ref Range Status   02/03/2021 >60.0 >60 mL/min/1.73 m^2 Final     Comment:     Calculation used to obtain the estimated glomerular filtration  rate (eGFR) is the CKD-EPI equation.        Lab Results   Component Value Date    HGBA1C 4.6 06/07/2018     Lab Results   Component Value Date    LDLCALC 83.6 02/03/2021     Lab Results   Component Value Date    TSH 0.91 01/20/2020         Assessment/Plan     Suyapa was seen today for annual exam.    Diagnoses and all orders for this visit:    Visit for annual health examination  History and physical exam completed.  Health maintenance reviewed as above.  -     CBC Auto Differential; Future  -     Comprehensive Metabolic Panel; Future  -     Hemoglobin A1C; Future  -     Lipid Panel; Future  -     TSH; Future    Screening, anemia, deficiency, iron  -     CBC Auto Differential; Future    Screening for diabetes mellitus  -     Comprehensive Metabolic Panel; Future  -     Hemoglobin A1C; Future    Lipid screening  -     Lipid Panel; Future    Screening for thyroid disorder  -     TSH; Future      HM as above  RTC in 12 mos, sooner if needed.  Fasting labs on Wed at [facility] at 9am.    Radha Negron MD  Department of Internal Medicine - [facility]  01/09/2023

## 2023-01-09 NOTE — Clinical Note
Please schedule fasting labs for Wed at 9am at [facility].  Her insurance is now updated in the system.  Also advise her to get her Flu shot and COVID booster at the pharmacy if not yet done (last COVID booster I can see is 5/2022).

## 2023-01-10 ENCOUNTER — TELEPHONE (OUTPATIENT)
Dept: INTERNAL MEDICINE | Facility: CLINIC | Age: 38
End: 2023-01-10
Payer: COMMERCIAL

## 2023-01-10 NOTE — TELEPHONE ENCOUNTER
----- Message from Radha Negron MD sent at 1/9/2023 11:52 AM CST -----  Please schedule fasting labs for Wed at 9am at [facility].  Her insurance is now updated in the system.  Also advise her to get her Flu shot and COVID booster at the pharmacy if not yet done (last COVID booster I can see is 5/2022).

## 2023-11-13 ENCOUNTER — PATIENT MESSAGE (OUTPATIENT)
Dept: INTERNAL MEDICINE | Facility: CLINIC | Age: 38
End: 2023-11-13
Payer: COMMERCIAL

## 2023-11-13 DIAGNOSIS — Z13.29 SCREENING FOR THYROID DISORDER: ICD-10-CM

## 2023-11-13 DIAGNOSIS — Z13.1 SCREENING FOR DIABETES MELLITUS: ICD-10-CM

## 2023-11-13 DIAGNOSIS — Z13.220 LIPID SCREENING: ICD-10-CM

## 2023-11-13 DIAGNOSIS — D64.9 ANEMIA, UNSPECIFIED TYPE: ICD-10-CM

## 2023-11-13 DIAGNOSIS — Z00.00 ANNUAL PHYSICAL EXAM: Primary | ICD-10-CM

## 2024-01-30 ENCOUNTER — PATIENT MESSAGE (OUTPATIENT)
Dept: INTERNAL MEDICINE | Facility: CLINIC | Age: 39
End: 2024-01-30
Payer: COMMERCIAL

## 2024-05-23 ENCOUNTER — PATIENT MESSAGE (OUTPATIENT)
Dept: INTERNAL MEDICINE | Facility: CLINIC | Age: 39
End: 2024-05-23
Payer: COMMERCIAL

## 2024-05-24 ENCOUNTER — LAB VISIT (OUTPATIENT)
Dept: LAB | Facility: HOSPITAL | Age: 39
End: 2024-05-24
Attending: INTERNAL MEDICINE
Payer: COMMERCIAL

## 2024-05-24 DIAGNOSIS — Z13.220 LIPID SCREENING: ICD-10-CM

## 2024-05-24 DIAGNOSIS — Z13.1 SCREENING FOR DIABETES MELLITUS: ICD-10-CM

## 2024-05-24 DIAGNOSIS — Z00.00 ANNUAL PHYSICAL EXAM: ICD-10-CM

## 2024-05-24 DIAGNOSIS — Z13.29 SCREENING FOR THYROID DISORDER: ICD-10-CM

## 2024-05-24 DIAGNOSIS — D64.9 ANEMIA, UNSPECIFIED TYPE: ICD-10-CM

## 2024-05-24 LAB
ALBUMIN SERPL BCP-MCNC: 3.9 G/DL (ref 3.5–5.2)
ALP SERPL-CCNC: 44 U/L (ref 55–135)
ALT SERPL W/O P-5'-P-CCNC: 11 U/L (ref 10–44)
ANION GAP SERPL CALC-SCNC: 6 MMOL/L (ref 8–16)
AST SERPL-CCNC: 14 U/L (ref 10–40)
BASOPHILS # BLD AUTO: 0.04 K/UL (ref 0–0.2)
BASOPHILS NFR BLD: 0.9 % (ref 0–1.9)
BILIRUB SERPL-MCNC: 0.6 MG/DL (ref 0.1–1)
BUN SERPL-MCNC: 12 MG/DL (ref 6–20)
CALCIUM SERPL-MCNC: 9.3 MG/DL (ref 8.7–10.5)
CHLORIDE SERPL-SCNC: 108 MMOL/L (ref 95–110)
CHOLEST SERPL-MCNC: 163 MG/DL (ref 120–199)
CHOLEST/HDLC SERPL: 3.3 {RATIO} (ref 2–5)
CO2 SERPL-SCNC: 24 MMOL/L (ref 23–29)
CREAT SERPL-MCNC: 0.8 MG/DL (ref 0.5–1.4)
DIFFERENTIAL METHOD BLD: NORMAL
EOSINOPHIL # BLD AUTO: 0.1 K/UL (ref 0–0.5)
EOSINOPHIL NFR BLD: 2.9 % (ref 0–8)
ERYTHROCYTE [DISTWIDTH] IN BLOOD BY AUTOMATED COUNT: 12.5 % (ref 11.5–14.5)
EST. GFR  (NO RACE VARIABLE): >60 ML/MIN/1.73 M^2
ESTIMATED AVG GLUCOSE: 88 MG/DL (ref 68–131)
FERRITIN SERPL-MCNC: 68 NG/ML (ref 20–300)
GLUCOSE SERPL-MCNC: 84 MG/DL (ref 70–110)
HBA1C MFR BLD: 4.7 % (ref 4–5.6)
HCT VFR BLD AUTO: 39 % (ref 37–48.5)
HDLC SERPL-MCNC: 50 MG/DL (ref 40–75)
HDLC SERPL: 30.7 % (ref 20–50)
HGB BLD-MCNC: 12.5 G/DL (ref 12–16)
IMM GRANULOCYTES # BLD AUTO: 0.01 K/UL (ref 0–0.04)
IMM GRANULOCYTES NFR BLD AUTO: 0.2 % (ref 0–0.5)
IRON SERPL-MCNC: 74 UG/DL (ref 30–160)
LDLC SERPL CALC-MCNC: 95.6 MG/DL (ref 63–159)
LYMPHOCYTES # BLD AUTO: 1.9 K/UL (ref 1–4.8)
LYMPHOCYTES NFR BLD: 43.3 % (ref 18–48)
MCH RBC QN AUTO: 29.3 PG (ref 27–31)
MCHC RBC AUTO-ENTMCNC: 32.1 G/DL (ref 32–36)
MCV RBC AUTO: 92 FL (ref 82–98)
MONOCYTES # BLD AUTO: 0.4 K/UL (ref 0.3–1)
MONOCYTES NFR BLD: 8.5 % (ref 4–15)
NEUTROPHILS # BLD AUTO: 2 K/UL (ref 1.8–7.7)
NEUTROPHILS NFR BLD: 44.2 % (ref 38–73)
NONHDLC SERPL-MCNC: 113 MG/DL
NRBC BLD-RTO: 0 /100 WBC
PLATELET # BLD AUTO: 210 K/UL (ref 150–450)
PMV BLD AUTO: 10.7 FL (ref 9.2–12.9)
POTASSIUM SERPL-SCNC: 4.1 MMOL/L (ref 3.5–5.1)
PROT SERPL-MCNC: 6.9 G/DL (ref 6–8.4)
RBC # BLD AUTO: 4.26 M/UL (ref 4–5.4)
SATURATED IRON: 22 % (ref 20–50)
SODIUM SERPL-SCNC: 138 MMOL/L (ref 136–145)
TOTAL IRON BINDING CAPACITY: 336 UG/DL (ref 250–450)
TRANSFERRIN SERPL-MCNC: 227 MG/DL (ref 200–375)
TRIGL SERPL-MCNC: 87 MG/DL (ref 30–150)
TSH SERPL DL<=0.005 MIU/L-ACNC: 0.62 UIU/ML (ref 0.4–4)
WBC # BLD AUTO: 4.46 K/UL (ref 3.9–12.7)

## 2024-05-24 PROCEDURE — 83036 HEMOGLOBIN GLYCOSYLATED A1C: CPT | Performed by: INTERNAL MEDICINE

## 2024-05-24 PROCEDURE — 84443 ASSAY THYROID STIM HORMONE: CPT | Performed by: INTERNAL MEDICINE

## 2024-05-24 PROCEDURE — 83540 ASSAY OF IRON: CPT | Performed by: INTERNAL MEDICINE

## 2024-05-24 PROCEDURE — 85025 COMPLETE CBC W/AUTO DIFF WBC: CPT | Performed by: INTERNAL MEDICINE

## 2024-05-24 PROCEDURE — 80061 LIPID PANEL: CPT | Performed by: INTERNAL MEDICINE

## 2024-05-24 PROCEDURE — 36415 COLL VENOUS BLD VENIPUNCTURE: CPT | Mod: PN | Performed by: INTERNAL MEDICINE

## 2024-05-24 PROCEDURE — 82728 ASSAY OF FERRITIN: CPT | Performed by: INTERNAL MEDICINE

## 2024-05-24 PROCEDURE — 80053 COMPREHEN METABOLIC PANEL: CPT | Performed by: INTERNAL MEDICINE

## 2024-05-27 NOTE — PROGRESS NOTES
INTERNAL MEDICINE ESTABLISHED PATIENT VISIT NOTE    Subjective:     Chief Complaint: Annual Exam       Patient ID: Suyapa Merritt is a 38 y.o. female with hx seizures in childhood but none since, hx uterine fibroids, last seen by me 1/2023, here today for annual exam and follow-up lab results.    Had ED visit 01/2023 at [facility] secondary to right lower quadrant abdominal pain.  Had CT abdomen pelvis with contrast which showed acute appendicitis and was admitted for laparoscopic appendectomy completed 01/30/2023.    States she is currently undergoing IVF via [facility].  Completed 1st round and is awaiting 2nd round.  Also has appointment for tomorrow with gynecology for well-woman exam (Dr. Lawson)    Past Medical History:  Past Medical History:   Diagnosis Date    Seizures     at 15 yo, w/u neg, ?sz in 2005, no issues since       Home Medications:  Prior to Admission medications    Not on File       Allergies:  Review of patient's allergies indicates:  No Known Allergies    Social History:  Social History     Tobacco Use    Smoking status: Never   Substance Use Topics    Alcohol use: Yes     Comment: socially    Drug use: No        Review of Systems   Constitutional:  Negative for activity change, appetite change, chills, fatigue, fever and unexpected weight change.   HENT:  Negative for congestion, hearing loss, rhinorrhea and trouble swallowing.    Eyes:  Negative for pain, discharge and visual disturbance.   Respiratory:  Negative for cough, chest tightness, shortness of breath and wheezing.    Cardiovascular:  Negative for chest pain, palpitations and leg swelling.   Gastrointestinal:  Negative for abdominal distention, abdominal pain, blood in stool, constipation, diarrhea and vomiting.   Endocrine: Negative for polydipsia and polyuria.   Genitourinary:  Negative for decreased urine volume, difficulty urinating, dysuria, hematuria, menstrual problem and vaginal discharge.   Musculoskeletal:  Negative for  "arthralgias, joint swelling and neck pain.   Neurological:  Negative for weakness, numbness and headaches.   Psychiatric/Behavioral:  Negative for behavioral problems, confusion and dysphoric mood.          Health Maintenance:     Immunizations:   Influenza rec now.  TDap 6/2022  COVID 4/2021, 5/2022, booster rec when ready.     Cancer Screening:  PAP: 1/27/21 [facility] NILM c neg HPV, has WWE pending for tomorrow  Mammogram: 1/21/21 due to palpable lump in R breast c fibroglandular breast tissue and B u/s done c rec f/u 6 mos which pt states was normal.  Can repeat at 40.  Colonoscopy rec at 45 (pt would like to have done sooner, advised insurance would not cover unless issues/sx)      Objective:   /60 (BP Location: Left arm, Patient Position: Sitting, BP Method: Medium (Manual))   Pulse 82   Ht 5' 5" (1.651 m)   Wt 68.4 kg (150 lb 12.7 oz)   LMP 05/11/2024   BMI 25.09 kg/m²        General: AAO x3, no apparent distress  HEENT: no cervical LAD, no thyroid masses appreciated  CV: RRR, no m/r/g  Pulm: Lungs CTAB, no crackles, no wheezes  Abd: s/NT/ND +BS  Extremities: no c/c/e    Labs:     Lab Results   Component Value Date    WBC 4.46 05/24/2024    HGB 12.5 05/24/2024    HCT 39.0 05/24/2024    MCV 92 05/24/2024     05/24/2024     Sodium   Date Value Ref Range Status   05/24/2024 138 136 - 145 mmol/L Final     Potassium   Date Value Ref Range Status   05/24/2024 4.1 3.5 - 5.1 mmol/L Final     Chloride   Date Value Ref Range Status   05/24/2024 108 95 - 110 mmol/L Final     CO2   Date Value Ref Range Status   05/24/2024 24 23 - 29 mmol/L Final     Glucose   Date Value Ref Range Status   05/24/2024 84 70 - 110 mg/dL Final     BUN   Date Value Ref Range Status   05/24/2024 12 6 - 20 mg/dL Final     Creatinine   Date Value Ref Range Status   05/24/2024 0.8 0.5 - 1.4 mg/dL Final     Calcium   Date Value Ref Range Status   05/24/2024 9.3 8.7 - 10.5 mg/dL Final     Total Protein   Date Value Ref Range Status "   05/24/2024 6.9 6.0 - 8.4 g/dL Final     Albumin   Date Value Ref Range Status   05/24/2024 3.9 3.5 - 5.2 g/dL Final     Total Bilirubin   Date Value Ref Range Status   05/24/2024 0.6 0.1 - 1.0 mg/dL Final     Comment:     For infants and newborns, interpretation of results should be based  on gestational age, weight and in agreement with clinical  observations.    Premature Infant recommended reference ranges:  Up to 24 hours.............<8.0 mg/dL  Up to 48 hours............<12.0 mg/dL  3-5 days..................<15.0 mg/dL  6-29 days.................<15.0 mg/dL       Alkaline Phosphatase   Date Value Ref Range Status   05/24/2024 44 (L) 55 - 135 U/L Final     AST   Date Value Ref Range Status   05/24/2024 14 10 - 40 U/L Final     ALT   Date Value Ref Range Status   05/24/2024 11 10 - 44 U/L Final     Anion Gap   Date Value Ref Range Status   05/24/2024 6 (L) 8 - 16 mmol/L Final     eGFR if    Date Value Ref Range Status   02/03/2021 >60.0 >60 mL/min/1.73 m^2 Final     eGFR if non    Date Value Ref Range Status   02/03/2021 >60.0 >60 mL/min/1.73 m^2 Final     Comment:     Calculation used to obtain the estimated glomerular filtration  rate (eGFR) is the CKD-EPI equation.        Lab Results   Component Value Date    HGBA1C 4.7 05/24/2024     Lab Results   Component Value Date    LDLCALC 95.6 05/24/2024     Lab Results   Component Value Date    TSH 0.625 05/24/2024         Assessment/Plan     Suyapa was seen today for annual exam.    Diagnoses and all orders for this visit:    Visit for annual health examination  History and physical exam completed.  Health maintenance reviewed as above.  All recent lab results d/w pt and are normal.  Advised to get COVID booster, pt plans to discuss c gyn first to see if she should wait until she is pregnant.      HM as above  RTC in 1 year, sooner if needed.    Radha Negron MD  Department of Internal Medicine - [facility]  05/28/2024

## 2024-05-28 ENCOUNTER — PATIENT OUTREACH (OUTPATIENT)
Dept: ADMINISTRATIVE | Facility: HOSPITAL | Age: 39
End: 2024-05-28
Payer: COMMERCIAL

## 2024-05-28 ENCOUNTER — OFFICE VISIT (OUTPATIENT)
Dept: INTERNAL MEDICINE | Facility: CLINIC | Age: 39
End: 2024-05-28
Payer: COMMERCIAL

## 2024-05-28 VITALS
HEART RATE: 82 BPM | DIASTOLIC BLOOD PRESSURE: 60 MMHG | HEIGHT: 65 IN | BODY MASS INDEX: 25.13 KG/M2 | SYSTOLIC BLOOD PRESSURE: 112 MMHG | WEIGHT: 150.81 LBS

## 2024-05-28 DIAGNOSIS — Z00.00 VISIT FOR ANNUAL HEALTH EXAMINATION: Primary | ICD-10-CM

## 2024-05-28 PROCEDURE — 1160F RVW MEDS BY RX/DR IN RCRD: CPT | Mod: CPTII,S$GLB,, | Performed by: INTERNAL MEDICINE

## 2024-05-28 PROCEDURE — 3078F DIAST BP <80 MM HG: CPT | Mod: CPTII,S$GLB,, | Performed by: INTERNAL MEDICINE

## 2024-05-28 PROCEDURE — 99395 PREV VISIT EST AGE 18-39: CPT | Mod: S$GLB,,, | Performed by: INTERNAL MEDICINE

## 2024-05-28 PROCEDURE — 1159F MED LIST DOCD IN RCRD: CPT | Mod: CPTII,S$GLB,, | Performed by: INTERNAL MEDICINE

## 2024-05-28 PROCEDURE — 3074F SYST BP LT 130 MM HG: CPT | Mod: CPTII,S$GLB,, | Performed by: INTERNAL MEDICINE

## 2024-05-28 PROCEDURE — 99999 PR PBB SHADOW E&M-EST. PATIENT-LVL III: CPT | Mod: PBBFAC,,, | Performed by: INTERNAL MEDICINE

## 2024-05-28 PROCEDURE — 3044F HG A1C LEVEL LT 7.0%: CPT | Mod: CPTII,S$GLB,, | Performed by: INTERNAL MEDICINE

## 2024-05-28 PROCEDURE — 3008F BODY MASS INDEX DOCD: CPT | Mod: CPTII,S$GLB,, | Performed by: INTERNAL MEDICINE

## 2024-05-28 NOTE — Clinical Note
Please get records of last Pap, patient states that was done 01/27/2021 via Dr. Lawson at [facility] neg with negative HPV, also has f/u tomorrow.

## 2024-05-28 NOTE — PROGRESS NOTES
Health Maintenance Due   Topic Date Due    COVID-19 Vaccine (4 - 2023-24 season) 09/01/2023    Cervical Cancer Screening  03/15/2024     Triggered LINKS and reconciled immunizations. Updated Care Everywhere- imported outside pap smear & HPV results found in Care Everywhere into . Chart review completed.

## 2024-05-29 LAB
HUMAN PAPILLOMAVIRUS (HPV): NORMAL
PAP RECOMMENDATION EXT: NORMAL
PAP SMEAR: NORMAL

## 2024-06-03 ENCOUNTER — PATIENT OUTREACH (OUTPATIENT)
Dept: ADMINISTRATIVE | Facility: HOSPITAL | Age: 39
End: 2024-06-03
Payer: COMMERCIAL

## 2024-06-03 NOTE — PROGRESS NOTES
Health Maintenance Due   Topic Date Due    COVID-19 Vaccine (4 - 2023-24 season) 09/01/2023     Triggered LINKS and reconciled immunizations. Updated Care Everywhere- imported outside pap smear & HPV results found in Care Everywhere into . Chart review completed.

## 2024-06-10 ENCOUNTER — PATIENT MESSAGE (OUTPATIENT)
Dept: INTERNAL MEDICINE | Facility: CLINIC | Age: 39
End: 2024-06-10
Payer: COMMERCIAL

## 2025-05-29 ENCOUNTER — OFFICE VISIT (OUTPATIENT)
Dept: INTERNAL MEDICINE | Facility: CLINIC | Age: 40
End: 2025-05-29
Payer: COMMERCIAL

## 2025-05-29 VITALS
OXYGEN SATURATION: 99 % | HEART RATE: 97 BPM | SYSTOLIC BLOOD PRESSURE: 110 MMHG | DIASTOLIC BLOOD PRESSURE: 62 MMHG | WEIGHT: 160.5 LBS | BODY MASS INDEX: 26.74 KG/M2 | HEIGHT: 65 IN

## 2025-05-29 DIAGNOSIS — Z00.00 VISIT FOR ANNUAL HEALTH EXAMINATION: Primary | ICD-10-CM

## 2025-05-29 PROCEDURE — 1159F MED LIST DOCD IN RCRD: CPT | Mod: CPTII,S$GLB,, | Performed by: INTERNAL MEDICINE

## 2025-05-29 PROCEDURE — 3008F BODY MASS INDEX DOCD: CPT | Mod: CPTII,S$GLB,, | Performed by: INTERNAL MEDICINE

## 2025-05-29 PROCEDURE — 3078F DIAST BP <80 MM HG: CPT | Mod: CPTII,S$GLB,, | Performed by: INTERNAL MEDICINE

## 2025-05-29 PROCEDURE — 99999 PR PBB SHADOW E&M-EST. PATIENT-LVL III: CPT | Mod: PBBFAC,,, | Performed by: INTERNAL MEDICINE

## 2025-05-29 PROCEDURE — 3074F SYST BP LT 130 MM HG: CPT | Mod: CPTII,S$GLB,, | Performed by: INTERNAL MEDICINE

## 2025-05-29 PROCEDURE — 1160F RVW MEDS BY RX/DR IN RCRD: CPT | Mod: CPTII,S$GLB,, | Performed by: INTERNAL MEDICINE

## 2025-05-29 PROCEDURE — 99395 PREV VISIT EST AGE 18-39: CPT | Mod: S$GLB,,, | Performed by: INTERNAL MEDICINE

## 2025-05-29 RX ORDER — ASPIRIN 81 MG/1
1 TABLET ORAL DAILY
COMMUNITY

## 2025-05-29 NOTE — PROGRESS NOTES
INTERNAL MEDICINE ESTABLISHED PATIENT VISIT NOTE    Subjective:     Chief Complaint: Annual Exam       Patient ID: Suyapa Merritt is a 39 y.o. female with hx seizures in childhood but none since, hx uterine fibroids, last seen by me 5/2024, here today for annual exam.    Currently in her 3rd trimester of pregnancy and feeling well, due in July and followed by Dr. Lawson.  Had labs done in Jan and again last month c mild anemia and now on iron supplement.  No issues c constipation.  Denies abd pain or LE edema.    Past Medical History:  Past Medical History:   Diagnosis Date    Seizures     at 15 yo, w/u neg, ?sz in 2005, no issues since       Home Medications:  Prior to Admission medications    Not on File       Allergies:  Review of patient's allergies indicates:  No Known Allergies    Social History:  Social History[1]     Review of Systems   Constitutional:  Negative for activity change and unexpected weight change.   HENT:  Negative for hearing loss, rhinorrhea and trouble swallowing.    Eyes:  Negative for discharge and visual disturbance.   Respiratory:  Negative for chest tightness and wheezing.    Cardiovascular:  Negative for chest pain and palpitations.   Gastrointestinal:  Negative for blood in stool, constipation, diarrhea and vomiting.   Endocrine: Negative for polydipsia and polyuria.   Genitourinary:  Negative for difficulty urinating, dysuria, hematuria and menstrual problem.   Musculoskeletal:  Negative for arthralgias, joint swelling and neck pain.   Neurological:  Negative for weakness and headaches.   Psychiatric/Behavioral:  Negative for confusion and dysphoric mood.          Health Maintenance:     Immunizations:   Influenza 1/2025  TDap 5/2025  COVID 4/2021, 5/2022, booster rec when ready.     Cancer Screening:  PAP: 5/2024 [facility] NILM c neg HPV  Mammogram: 1/21/21 due to palpable lump in R breast c fibroglandular breast tissue and B u/s done c rec f/u 6 mos which pt states was normal.   "Rec repeat p pregnancy and when completed breast feeding.  Colonoscopy rec at 45 (pt would like to have done sooner, advised insurance would not cover unless issues/sx)       Objective:   /62 (Patient Position: Sitting)   Pulse 97   Ht 5' 5" (1.651 m)   Wt 72.8 kg (160 lb 7.9 oz)   SpO2 99%   BMI 26.71 kg/m²        General: AAO x3, no apparent distress  HEENT: no cervical LAD, no thyroid masses appreciated  CV: RRR, no m/r/g  Pulm: Lungs CTAB, no crackles, no wheezes  Abd: s/NT, +BS, gravid uterus  Extremities: no c/c/e    Labs:     Lab Results   Component Value Date    WBC 4.46 05/24/2024    HGB 12.5 05/24/2024    HCT 39.0 05/24/2024    MCV 92 05/24/2024     05/24/2024     Sodium   Date Value Ref Range Status   05/24/2024 138 136 - 145 mmol/L Final     Potassium   Date Value Ref Range Status   05/24/2024 4.1 3.5 - 5.1 mmol/L Final     Chloride   Date Value Ref Range Status   05/24/2024 108 95 - 110 mmol/L Final     CO2   Date Value Ref Range Status   05/24/2024 24 23 - 29 mmol/L Final     Glucose   Date Value Ref Range Status   05/24/2024 84 70 - 110 mg/dL Final     BUN   Date Value Ref Range Status   05/24/2024 12 6 - 20 mg/dL Final     Creatinine   Date Value Ref Range Status   05/24/2024 0.8 0.5 - 1.4 mg/dL Final     Calcium   Date Value Ref Range Status   05/24/2024 9.3 8.7 - 10.5 mg/dL Final     Total Protein   Date Value Ref Range Status   05/24/2024 6.9 6.0 - 8.4 g/dL Final     Albumin   Date Value Ref Range Status   05/24/2024 3.9 3.5 - 5.2 g/dL Final     Total Bilirubin   Date Value Ref Range Status   05/24/2024 0.6 0.1 - 1.0 mg/dL Final     Comment:     For infants and newborns, interpretation of results should be based  on gestational age, weight and in agreement with clinical  observations.    Premature Infant recommended reference ranges:  Up to 24 hours.............<8.0 mg/dL  Up to 48 hours............<12.0 mg/dL  3-5 days..................<15.0 mg/dL  6-29 " days.................<15.0 mg/dL       Alkaline Phosphatase   Date Value Ref Range Status   05/24/2024 44 (L) 55 - 135 U/L Final     AST   Date Value Ref Range Status   05/24/2024 14 10 - 40 U/L Final     ALT   Date Value Ref Range Status   05/24/2024 11 10 - 44 U/L Final     Anion Gap   Date Value Ref Range Status   05/24/2024 6 (L) 8 - 16 mmol/L Final     eGFR if    Date Value Ref Range Status   02/03/2021 >60.0 >60 mL/min/1.73 m^2 Final     eGFR if non    Date Value Ref Range Status   02/03/2021 >60.0 >60 mL/min/1.73 m^2 Final     Comment:     Calculation used to obtain the estimated glomerular filtration  rate (eGFR) is the CKD-EPI equation.        Lab Results   Component Value Date    HGBA1C 4.7 05/24/2024     Lab Results   Component Value Date    LDLCALC 95.6 05/24/2024     Lab Results   Component Value Date    TSH 0.625 05/24/2024         Assessment/Plan     Suyapa was seen today for annual exam.    Diagnoses and all orders for this visit:    Visit for annual health examination  History and physical exam completed.  Health maintenance reviewed as above.  Declined annual labs c me today since majority of labs already completed via gyn.  Can see me next year for repeat annual labs.      HM as above  RTC in 1 year, sooner if needed.  Fasting labs today.    Radha Negron MD  Department of Internal Medicine - [facility]  05/29/2025         [1]   Social History  Tobacco Use    Smoking status: Never   Substance Use Topics    Alcohol use: Yes     Comment: socially    Drug use: No

## 2025-08-20 DIAGNOSIS — Z12.31 OTHER SCREENING MAMMOGRAM: ICD-10-CM
